# Patient Record
Sex: FEMALE | Race: WHITE | URBAN - METROPOLITAN AREA
[De-identification: names, ages, dates, MRNs, and addresses within clinical notes are randomized per-mention and may not be internally consistent; named-entity substitution may affect disease eponyms.]

---

## 2017-01-24 ENCOUNTER — IMPORTED ENCOUNTER (OUTPATIENT)
Dept: URBAN - METROPOLITAN AREA CLINIC 50 | Facility: CLINIC | Age: 72
End: 2017-01-24

## 2017-08-24 ENCOUNTER — IMPORTED ENCOUNTER (OUTPATIENT)
Dept: URBAN - METROPOLITAN AREA CLINIC 50 | Facility: CLINIC | Age: 72
End: 2017-08-24

## 2018-02-27 ENCOUNTER — IMPORTED ENCOUNTER (OUTPATIENT)
Dept: URBAN - METROPOLITAN AREA CLINIC 50 | Facility: CLINIC | Age: 73
End: 2018-02-27

## 2018-08-28 ENCOUNTER — IMPORTED ENCOUNTER (OUTPATIENT)
Dept: URBAN - METROPOLITAN AREA CLINIC 50 | Facility: CLINIC | Age: 73
End: 2018-08-28

## 2019-03-26 ENCOUNTER — IMPORTED ENCOUNTER (OUTPATIENT)
Dept: URBAN - METROPOLITAN AREA CLINIC 50 | Facility: CLINIC | Age: 74
End: 2019-03-26

## 2019-09-24 ENCOUNTER — IMPORTED ENCOUNTER (OUTPATIENT)
Dept: URBAN - METROPOLITAN AREA CLINIC 50 | Facility: CLINIC | Age: 74
End: 2019-09-24

## 2021-03-03 ENCOUNTER — IMPORTED ENCOUNTER (OUTPATIENT)
Dept: URBAN - METROPOLITAN AREA CLINIC 50 | Facility: CLINIC | Age: 76
End: 2021-03-03

## 2021-03-03 NOTE — PATIENT DISCUSSION
"""[facility]F? OCT RNFL reviewed with patient No evidence of disease.  Possibly anatomical variant right ""

## 2021-04-17 ASSESSMENT — TONOMETRY
OD_IOP_MMHG: 16
OD_IOP_MMHG: 16
OS_IOP_MMHG: 15
OD_IOP_MMHG: 17
OS_IOP_MMHG: 16
OS_IOP_MMHG: 16
OS_IOP_MMHG: 15
OS_IOP_MMHG: 17
OS_IOP_MMHG: 12
OD_IOP_MMHG: 14
OS_IOP_MMHG: 17
OS_IOP_MMHG: 17
OD_IOP_MMHG: 16

## 2021-04-17 ASSESSMENT — VISUAL ACUITY
OS_CC: J1+@ 16 IN
OS_OTHER: 20/50. 20/80-.
OD_OTHER: 20/40. 20/40.
OD_CC: J1
OS_BAT: 20/30
OS_SC: 20/30
OD_PH: 20/25+
OD_SC: 20/30
OD_CC: J1+@ 16 IN
OS_CC: J1
OD_BAT: 20/40
OS_SC: 20/30
OD_SC: 20/30
OS_SC: 20/25-
OD_SC: 20/30
OD_SC: 20/30
OD_SC: 20/30+
OD_PH: 20/25
OS_SC: 20/25-
OD_BAT: 20/30
OS_CC: J2+
OS_BAT: 20/30
OD_BAT: 20/70
OD_SC: 20/30
OS_CC: J1@ 16 IN
OS_OTHER: 20/30. 20/40.
OS_SC: 20/30
OS_OTHER: 20/30. 20/40.
OS_SC: 20/30
OD_CC: J2+
OD_SC: 20/30
OD_CC: J1@ 16 IN
OD_OTHER: 20/70. 20/80-.
OS_BAT: 20/50
OS_SC: 20/30
OD_OTHER: 20/30. 20/40.

## 2021-04-17 ASSESSMENT — PACHYMETRY
OD_CT_UM: 589
OS_CT_UM: 602

## 2022-02-28 ENCOUNTER — PREPPED CHART (OUTPATIENT)
Dept: URBAN - METROPOLITAN AREA CLINIC 49 | Facility: CLINIC | Age: 77
End: 2022-02-28

## 2022-03-02 ENCOUNTER — COMPREHENSIVE EXAM (OUTPATIENT)
Dept: URBAN - METROPOLITAN AREA CLINIC 49 | Facility: CLINIC | Age: 77
End: 2022-03-02

## 2022-03-02 DIAGNOSIS — H35.373: ICD-10-CM

## 2022-03-02 DIAGNOSIS — H40.011: ICD-10-CM

## 2022-03-02 PROCEDURE — 92134 CPTRZ OPH DX IMG PST SGM RTA: CPT

## 2022-03-02 PROCEDURE — 92015 DETERMINE REFRACTIVE STATE: CPT

## 2022-03-02 PROCEDURE — 92014 COMPRE OPH EXAM EST PT 1/>: CPT

## 2022-03-02 ASSESSMENT — VISUAL ACUITY
OD_PH: 20/25
OU_SC: 20/30
OS_SC: J2
OD_GLARE: 20/80
OD_SC: J3
OS_SC: 20/30
OS_GLARE: 20/60
OS_PH: 20/25
OD_SC: 20/40
OS_GLARE: 20/70
OU_SC: J2
OD_GLARE: 20/70

## 2022-03-02 ASSESSMENT — TONOMETRY
OD_IOP_MMHG: 13
OS_IOP_MMHG: 09
OD_IOP_MMHG: 10
OS_IOP_MMHG: 13

## 2022-06-28 ENCOUNTER — DIAGNOSTICS ONLY (OUTPATIENT)
Dept: URBAN - METROPOLITAN AREA CLINIC 49 | Facility: CLINIC | Age: 77
End: 2022-06-28

## 2022-06-28 DIAGNOSIS — H40.011: ICD-10-CM

## 2022-06-28 PROCEDURE — 92133 CPTRZD OPH DX IMG PST SGM ON: CPT

## 2022-06-28 PROCEDURE — 92083 EXTENDED VISUAL FIELD XM: CPT

## 2022-12-29 NOTE — PATIENT DISCUSSION
"""Not central. "" ""PCO(s) are not visually significant for capsulotomy.  Monitor by regular examinations. """ no

## 2023-02-10 NOTE — PATIENT DISCUSSION
"""Call if vision decreases or RD warning signs increases/RD warnings given. No signs of retinal tear. Retinal detachment precautions discussed.  """ none

## 2024-03-28 ENCOUNTER — COMPREHENSIVE EXAM (OUTPATIENT)
Dept: URBAN - METROPOLITAN AREA CLINIC 49 | Facility: LOCATION | Age: 79
End: 2024-03-28

## 2024-03-28 DIAGNOSIS — H26.492: ICD-10-CM

## 2024-03-28 DIAGNOSIS — H40.011: ICD-10-CM

## 2024-03-28 DIAGNOSIS — H43.813: ICD-10-CM

## 2024-03-28 DIAGNOSIS — H04.123: ICD-10-CM

## 2024-03-28 DIAGNOSIS — H52.4: ICD-10-CM

## 2024-03-28 DIAGNOSIS — H35.373: ICD-10-CM

## 2024-03-28 PROCEDURE — 99214 OFFICE O/P EST MOD 30 MIN: CPT

## 2024-03-28 PROCEDURE — 92134 CPTRZ OPH DX IMG PST SGM RTA: CPT

## 2024-03-28 PROCEDURE — 92015 DETERMINE REFRACTIVE STATE: CPT

## 2024-03-28 ASSESSMENT — KERATOMETRY
OD_K1POWER_DIOPTERS: 42.50
OS_AXISANGLE_DEGREES: 125
OD_AXISANGLE_DEGREES: 0
OS_K2POWER_DIOPTERS: 43.00
OD_K2POWER_DIOPTERS: 42.50
OD_AXISANGLE2_DEGREES: 90
OS_K1POWER_DIOPTERS: 42.75
OS_AXISANGLE2_DEGREES: 35

## 2024-03-28 ASSESSMENT — VISUAL ACUITY
OU_SC: J2@15
OD_PH: 20/25-1
OD_SC: 20/30-2
OS_GLARE: 20/30
OS_SC: 20/25-1
OS_GLARE: 20/25

## 2024-03-28 ASSESSMENT — TONOMETRY
OD_IOP_MMHG: 18
OS_IOP_MMHG: 15
OD_IOP_MMHG: 15
OS_IOP_MMHG: 19

## 2024-06-28 ENCOUNTER — TECH ONLY (OUTPATIENT)
Dept: URBAN - METROPOLITAN AREA CLINIC 48 | Facility: LOCATION | Age: 79
End: 2024-06-28

## 2024-06-28 DIAGNOSIS — H40.011: ICD-10-CM

## 2024-06-28 PROCEDURE — 92083 EXTENDED VISUAL FIELD XM: CPT

## 2024-06-28 PROCEDURE — 92133 CPTRZD OPH DX IMG PST SGM ON: CPT

## 2024-06-28 ASSESSMENT — KERATOMETRY
OS_K2POWER_DIOPTERS: 43.00
OD_AXISANGLE_DEGREES: 0
OD_AXISANGLE2_DEGREES: 90
OS_AXISANGLE_DEGREES: 125
OS_K1POWER_DIOPTERS: 42.75
OS_AXISANGLE2_DEGREES: 35
OD_K2POWER_DIOPTERS: 42.50
OD_K1POWER_DIOPTERS: 42.50

## 2025-04-02 ENCOUNTER — COMPREHENSIVE EXAM (OUTPATIENT)
Age: 80
End: 2025-04-02

## 2025-04-02 DIAGNOSIS — H52.4: ICD-10-CM

## 2025-04-02 DIAGNOSIS — H04.123: ICD-10-CM

## 2025-04-02 DIAGNOSIS — H43.813: ICD-10-CM

## 2025-04-02 DIAGNOSIS — H26.492: ICD-10-CM

## 2025-04-02 DIAGNOSIS — H40.011: ICD-10-CM

## 2025-04-02 PROCEDURE — 92134 CPTRZ OPH DX IMG PST SGM RTA: CPT | Mod: NC

## 2025-04-02 PROCEDURE — 99214 OFFICE O/P EST MOD 30 MIN: CPT
